# Patient Record
Sex: MALE | Race: WHITE
[De-identification: names, ages, dates, MRNs, and addresses within clinical notes are randomized per-mention and may not be internally consistent; named-entity substitution may affect disease eponyms.]

---

## 2018-11-01 ENCOUNTER — HOSPITAL ENCOUNTER (OUTPATIENT)
Dept: HOSPITAL 53 - M RAD | Age: 23
End: 2018-11-01
Attending: STUDENT IN AN ORGANIZED HEALTH CARE EDUCATION/TRAINING PROGRAM
Payer: COMMERCIAL

## 2018-11-01 DIAGNOSIS — Q39.1: Primary | ICD-10-CM

## 2018-11-01 PROCEDURE — 74220 X-RAY XM ESOPHAGUS 1CNTRST: CPT

## 2018-12-21 ENCOUNTER — HOSPITAL ENCOUNTER (OUTPATIENT)
Dept: HOSPITAL 53 - M OPP | Age: 23
Discharge: HOME | End: 2018-12-21
Attending: INTERNAL MEDICINE
Payer: COMMERCIAL

## 2018-12-21 VITALS — BODY MASS INDEX: 15.4 KG/M2 | WEIGHT: 110 LBS | HEIGHT: 71 IN

## 2018-12-21 VITALS — DIASTOLIC BLOOD PRESSURE: 65 MMHG | SYSTOLIC BLOOD PRESSURE: 109 MMHG

## 2018-12-21 DIAGNOSIS — K29.70: Primary | ICD-10-CM

## 2018-12-21 DIAGNOSIS — K21.9: ICD-10-CM

## 2018-12-21 DIAGNOSIS — J45.909: ICD-10-CM

## 2018-12-21 NOTE — ROOR
________________________________________________________________________________

Patient Name: Abram Perez               Procedure Date: 12/21/2018 12:10 PM

MRN: [id number]                          Account Number: [id number]

YOB: 1995               Age: 23

Room: [facility]                            Gender: Male

Note Status: Finalized                 

________________________________________________________________________________

 

Procedure:           Upper GI endoscopy

Indications:         Dysphagia

Providers:           Christian Monterroso MD

Referring MD:        Pascual Brooks Do

Requesting Provider: 

Medicines:           Monitored Anesthesia Care

Complications:       No immediate complications.

________________________________________________________________________________

Procedure:           Pre-Anesthesia Assessment:

                     - Prior to the procedure, a History and Physical was 

                     performed, and patient medications and allergies were 

                     reviewed. The patient is competent. The risks and 

                     benefits of the procedure and the sedation options and 

                     risks were discussed with the patient. All questions were 

                     answered and informed consent was obtained. Patient 

                     identification and proposed procedure were verified by 

                     the physician, the nurse and the anesthesiologist in the 

                     procedure room. Mental Status Examination: alert and 

                     oriented. Airway Examination: normal oropharyngeal airway 

                     and neck mobility. Respiratory Examination: clear to 

                     auscultation. CV Examination: normal. Prophylactic 

                     Antibiotics: The patient does not require prophylactic 

                     antibiotics. Prior Anticoagulants: The patient has taken 

                     no previous anticoagulant or antiplatelet agents. ASA 

                     Grade Assessment: I - A normal, healthy patient. After 

                     reviewing the risks and benefits, the patient was deemed 

                     in satisfactory condition to undergo the procedure. The 

                     anesthesia plan was to use monitored anesthesia care 

                     (MAC). Immediately prior to administration of 

                     medications, the patient was re-assessed for adequacy to 

                     receive sedatives. The heart rate, respiratory rate, 

                     oxygen saturations, blood pressure, adequacy of pulmonary 

                     ventilation, and response to care were monitored 

                     throughout the procedure. The physical status of the 

                     patient was re-assessed after the procedure.

                     The Endoscope was introduced through the mouth, and 

                     advanced to the second part of duodenum. The upper GI 

                     endoscopy was accomplished without difficulty. The 

                     patient tolerated the procedure well.

                                                                                

Findings:

     A 15 mm scar was found in the middle third of the esophagus, 30 cm from 

     the incisors. The scar tissue was healthy in appearance. Biopsies were 

     taken with a cold forceps for histology. Verification of patient 

     identification for the specimen was done by the physician and nurse using 

     the patient's name, birth date and medical record number.

     The Z-line was irregular and was found 39 cm from the incisors.

     Mucosal changes including longitudinal furrows were found in the middle 

     third of the esophagus and in the lower third of the esophagus. Biopsies 

     were obtained from the proximal and distal esophagus with cold forceps 

     for histology of suspected eosinophilic esophagitis.

     Diffuse moderate inflammation characterized by erythema and granularity 

     was found in the gastric body and in the gastric antrum. Biopsies were 

     taken with a cold forceps for Helicobacter pylori testing.

     The duodenal bulb and second portion of the duodenum were normal.

                                                                                

Impression:          - Scar in the middle third of the esophagus. Biopsied.

                     - Z-line irregular, 39 cm from the incisors.

                     - Esophageal mucosal changes suspicious for eosinophilic 

                     esophagitis. Biopsied.

                     - Gastritis. Biopsied.

                     - Normal duodenal bulb and second portion of the duodenum.

Recommendation:      - Patient has a contact number available for emergencies. 

                     The signs and symptoms of potential delayed complications 

                     were discussed with the patient. Return to normal 

                     activities tomorrow. Written discharge instructions were 

                     provided to the patient.

                     - Resume previous diet.

                     - Continue present medications.

                     - Await pathology results.

                     - Use Protonix (pantoprazole) 40 mg PO daily - to be 

                     taken early morning 1/2 hour before breakfast for 6 weeks.

                     - Based on the biopsy results you will receive a phone 

                     call from GI clinic in 2-3 weeks to review the pathology 

                     results AND/OR your results will be faxed to your Primary 

                     care physician.

                     - Return to primary care physician.

                                                                                

 

Christian Monterroso MD

_______________________

Christian Monterroso MD

12/21/2018 12:59:17 PM

This report has been signed electronically.

Number of Addenda: 0

 

Note Initiated On: 12/21/2018 12:10 PM

Estimated Blood Loss:

     Estimated blood loss was minimal.